# Patient Record
Sex: FEMALE | Race: OTHER | HISPANIC OR LATINO | URBAN - METROPOLITAN AREA
[De-identification: names, ages, dates, MRNs, and addresses within clinical notes are randomized per-mention and may not be internally consistent; named-entity substitution may affect disease eponyms.]

---

## 2018-03-28 ENCOUNTER — EMERGENCY (EMERGENCY)
Facility: HOSPITAL | Age: 28
LOS: 0 days | Discharge: HOME | End: 2018-03-28
Attending: EMERGENCY MEDICINE

## 2018-03-28 VITALS
TEMPERATURE: 98 F | RESPIRATION RATE: 18 BRPM | SYSTOLIC BLOOD PRESSURE: 161 MMHG | HEART RATE: 88 BPM | DIASTOLIC BLOOD PRESSURE: 84 MMHG | OXYGEN SATURATION: 100 %

## 2018-03-28 VITALS
RESPIRATION RATE: 18 BRPM | DIASTOLIC BLOOD PRESSURE: 78 MMHG | TEMPERATURE: 98 F | SYSTOLIC BLOOD PRESSURE: 146 MMHG | OXYGEN SATURATION: 100 % | HEART RATE: 78 BPM

## 2018-03-28 LAB
ALBUMIN SERPL ELPH-MCNC: 4.3 G/DL — SIGNIFICANT CHANGE UP (ref 3.5–5.2)
ALP SERPL-CCNC: 49 U/L — SIGNIFICANT CHANGE UP (ref 30–115)
ALT FLD-CCNC: 10 U/L — SIGNIFICANT CHANGE UP (ref 0–41)
ANION GAP SERPL CALC-SCNC: 11 MMOL/L — SIGNIFICANT CHANGE UP (ref 7–14)
APTT BLD: 29.6 SEC — SIGNIFICANT CHANGE UP (ref 27–39.2)
AST SERPL-CCNC: 15 U/L — SIGNIFICANT CHANGE UP (ref 0–41)
BASOPHILS # BLD AUTO: 0.04 K/UL — SIGNIFICANT CHANGE UP (ref 0–0.2)
BASOPHILS NFR BLD AUTO: 0.5 % — SIGNIFICANT CHANGE UP (ref 0–1)
BILIRUB SERPL-MCNC: 0.5 MG/DL — SIGNIFICANT CHANGE UP (ref 0.2–1.2)
BUN SERPL-MCNC: 16 MG/DL — SIGNIFICANT CHANGE UP (ref 10–20)
CALCIUM SERPL-MCNC: 9.1 MG/DL — SIGNIFICANT CHANGE UP (ref 8.5–10.1)
CHLORIDE SERPL-SCNC: 101 MMOL/L — SIGNIFICANT CHANGE UP (ref 98–110)
CK MB BLD-MCNC: 1 % — SIGNIFICANT CHANGE UP (ref 0–4)
CK MB CFR SERPL CALC: 1.3 NG/ML — SIGNIFICANT CHANGE UP (ref 0.6–6.3)
CK SERPL-CCNC: 156 U/L — SIGNIFICANT CHANGE UP (ref 0–225)
CO2 SERPL-SCNC: 26 MMOL/L — SIGNIFICANT CHANGE UP (ref 17–32)
CREAT SERPL-MCNC: 0.9 MG/DL — SIGNIFICANT CHANGE UP (ref 0.7–1.5)
D DIMER BLD IA.RAPID-MCNC: 125 NG/ML DDU — SIGNIFICANT CHANGE UP (ref 0–230)
EOSINOPHIL # BLD AUTO: 0.25 K/UL — SIGNIFICANT CHANGE UP (ref 0–0.7)
EOSINOPHIL NFR BLD AUTO: 3.2 % — SIGNIFICANT CHANGE UP (ref 0–8)
GLUCOSE SERPL-MCNC: 87 MG/DL — SIGNIFICANT CHANGE UP (ref 70–99)
HCT VFR BLD CALC: 40.3 % — SIGNIFICANT CHANGE UP (ref 37–47)
HGB BLD-MCNC: 13.8 G/DL — SIGNIFICANT CHANGE UP (ref 12–16)
IMM GRANULOCYTES NFR BLD AUTO: 0.5 % — HIGH (ref 0.1–0.3)
INR BLD: 0.93 RATIO — SIGNIFICANT CHANGE UP (ref 0.65–1.3)
LYMPHOCYTES # BLD AUTO: 2.74 K/UL — SIGNIFICANT CHANGE UP (ref 1.2–3.4)
LYMPHOCYTES # BLD AUTO: 35.6 % — SIGNIFICANT CHANGE UP (ref 20.5–51.1)
MCHC RBC-ENTMCNC: 29.6 PG — SIGNIFICANT CHANGE UP (ref 27–31)
MCHC RBC-ENTMCNC: 34.2 G/DL — SIGNIFICANT CHANGE UP (ref 32–37)
MCV RBC AUTO: 86.3 FL — SIGNIFICANT CHANGE UP (ref 81–99)
MONOCYTES # BLD AUTO: 0.63 K/UL — HIGH (ref 0.1–0.6)
MONOCYTES NFR BLD AUTO: 8.2 % — SIGNIFICANT CHANGE UP (ref 1.7–9.3)
NEUTROPHILS # BLD AUTO: 4 K/UL — SIGNIFICANT CHANGE UP (ref 1.4–6.5)
NEUTROPHILS NFR BLD AUTO: 52 % — SIGNIFICANT CHANGE UP (ref 42.2–75.2)
NRBC # BLD: 0 /100 WBCS — SIGNIFICANT CHANGE UP (ref 0–0)
PLATELET # BLD AUTO: 327 K/UL — SIGNIFICANT CHANGE UP (ref 130–400)
POTASSIUM SERPL-MCNC: 4.4 MMOL/L — SIGNIFICANT CHANGE UP (ref 3.5–5)
POTASSIUM SERPL-SCNC: 4.4 MMOL/L — SIGNIFICANT CHANGE UP (ref 3.5–5)
PROT SERPL-MCNC: 7.5 G/DL — SIGNIFICANT CHANGE UP (ref 6–8)
PROTHROM AB SERPL-ACNC: 10 SEC — SIGNIFICANT CHANGE UP (ref 9.95–12.87)
RBC # BLD: 4.67 M/UL — SIGNIFICANT CHANGE UP (ref 4.2–5.4)
RBC # FLD: 12.4 % — SIGNIFICANT CHANGE UP (ref 11.5–14.5)
SODIUM SERPL-SCNC: 138 MMOL/L — SIGNIFICANT CHANGE UP (ref 135–146)
TROPONIN T SERPL-MCNC: <0.01 NG/ML — SIGNIFICANT CHANGE UP
WBC # BLD: 7.7 K/UL — SIGNIFICANT CHANGE UP (ref 4.8–10.8)
WBC # FLD AUTO: 7.7 K/UL — SIGNIFICANT CHANGE UP (ref 4.8–10.8)

## 2018-03-28 RX ORDER — SODIUM CHLORIDE 9 MG/ML
3 INJECTION INTRAMUSCULAR; INTRAVENOUS; SUBCUTANEOUS EVERY 8 HOURS
Qty: 0 | Refills: 0 | Status: DISCONTINUED | OUTPATIENT
Start: 2018-03-28 | End: 2018-03-28

## 2018-03-28 RX ORDER — SODIUM CHLORIDE 9 MG/ML
1000 INJECTION INTRAMUSCULAR; INTRAVENOUS; SUBCUTANEOUS
Qty: 0 | Refills: 0 | Status: DISCONTINUED | OUTPATIENT
Start: 2018-03-28 | End: 2018-03-28

## 2018-03-28 RX ADMIN — SODIUM CHLORIDE 3 MILLILITER(S): 9 INJECTION INTRAMUSCULAR; INTRAVENOUS; SUBCUTANEOUS at 20:29

## 2018-03-28 RX ADMIN — SODIUM CHLORIDE 125 MILLILITER(S): 9 INJECTION INTRAMUSCULAR; INTRAVENOUS; SUBCUTANEOUS at 20:29

## 2018-03-28 NOTE — ED PROVIDER NOTE - OBJECTIVE STATEMENT
26 yo female no sig hx present left chest pain x 1 day. pain is pressure like and worsen with deep inspiration and movement. Denies radiating pain. Reports she takes OCP but no hospitalization/surgery and legs swelling. Denies SOB/diaphoresis/weakness assoc with chest pain. denies fever/chill/abd pain/n/v/d.

## 2018-03-28 NOTE — ED PROVIDER NOTE - MUSCULOSKELETAL, MLM
Spine appears normal, range of motion is not limited, no muscle or joint tenderness. no calf swelling/tenderness

## 2018-03-28 NOTE — ED PROVIDER NOTE - ATTENDING CONTRIBUTION TO CARE
26 yo fm on ocp c/o left upper cp, sharp, pleuritic. no sob, leg pain or swelling. no zambrano, orthopnea. no fever or chills. no n, v, sweats. pt in nad, well appearing, heent nml, neck sup, ctab, rrr, ab soft, nt, nd. tender over left upper chest. non focal. will get labs, ekg, dimer, cxr.

## 2019-04-28 ENCOUNTER — EMERGENCY (EMERGENCY)
Facility: HOSPITAL | Age: 29
LOS: 0 days | Discharge: HOME | End: 2019-04-28
Admitting: EMERGENCY MEDICINE
Payer: COMMERCIAL

## 2019-04-28 VITALS
TEMPERATURE: 98 F | DIASTOLIC BLOOD PRESSURE: 79 MMHG | RESPIRATION RATE: 16 BRPM | HEART RATE: 80 BPM | OXYGEN SATURATION: 97 % | SYSTOLIC BLOOD PRESSURE: 124 MMHG

## 2019-04-28 DIAGNOSIS — M25.561 PAIN IN RIGHT KNEE: ICD-10-CM

## 2019-04-28 DIAGNOSIS — M25.569 PAIN IN UNSPECIFIED KNEE: ICD-10-CM

## 2019-04-28 PROCEDURE — 73562 X-RAY EXAM OF KNEE 3: CPT | Mod: 26,RT

## 2019-04-28 PROCEDURE — 99283 EMERGENCY DEPT VISIT LOW MDM: CPT

## 2019-04-28 RX ORDER — IBUPROFEN 200 MG
600 TABLET ORAL ONCE
Qty: 0 | Refills: 0 | Status: COMPLETED | OUTPATIENT
Start: 2019-04-28 | End: 2019-04-28

## 2019-04-28 RX ADMIN — Medication 600 MILLIGRAM(S): at 15:18

## 2019-04-28 NOTE — ED PROVIDER NOTE - CLINICAL SUMMARY MEDICAL DECISION MAKING FREE TEXT BOX
Patient here for right knee pain occurred while dancing. xray shows no acute fracture. Knee placed in knee immobilizer, ambulating in ED. Recommend nsaids, ice, follow up with ortho.

## 2019-04-28 NOTE — ED PROVIDER NOTE - OBJECTIVE STATEMENT
28 yo female with no significant pmh presents to the ED c/o right knee pain s/p dancing last night. Patient explains as she was dancing she felt her knee buckle. Denies direct trauma to knee or twisting injury. Since then patient states when she ambulates her knee gives out. No additional trauma. Denies chest pain, sob, abd pain, N/V, UE/LE weakness or paresthesias. no prior knee surgery or injury.

## 2019-04-28 NOTE — ED PROVIDER NOTE - NS ED ROS FT
Constitutional: no fever, chills  Cardiovascular: no chest pain, no sob  Respiratory: no cough, no shortness of breath.  Gastrointestinal: no nausea, vomiting   Musculoskeletal: + right knee pain. no hip, ankle or back pain  Integumentary: no rash or skin changes. no edema  Neurological: no head trauma.  no headache, no dizziness, no visual changes, no UE/LE weakness or paresthesias

## 2019-04-28 NOTE — ED PROVIDER NOTE - NSFOLLOWUPINSTRUCTIONS_ED_ALL_ED_FT

## 2019-04-28 NOTE — ED ADULT NURSE NOTE - OBJECTIVE STATEMENT
pt states she was out drinking last night and hurt her knee, her friends said she twisted her body around. Pt woke up with knee pain, unable to bend without pain.

## 2019-04-28 NOTE — ED PROVIDER NOTE - PHYSICAL EXAMINATION
GENERAL:  well appearing, non-toxic female in no acute distress  SKIN: skin warm, pink and dry. MMM. + mild swelling to right knee.  no erythema, increased warmth or ecchymosis. cap refill <2 secs.   PULM: CTAB. Normal respiratory effort. No respiratory distress. No wheezes, stridor, rales or rhonchi. No retractions  CV: RRR, no M/R/G.   ABD: Soft, non-tender, non-distended  MSK: + TTP right medial knee joint with ROM of knee. no hip or ankle tenderness.  No edema, erythema, cyanosis. dp pulses equal and intact bilaterally.   NEURO: A+Ox3, no sensory/motor deficits  PSYCH: appropriate behavior, cooperative.

## 2019-04-28 NOTE — ED PROVIDER NOTE - CARE PROVIDER_API CALL
Ochoa Chandler)  Orthopaedic Surgery  [address] [place] [address]  Phone: [phone]  Fax: [phone]  Follow Up Time:

## 2019-09-13 ENCOUNTER — TRANSCRIPTION ENCOUNTER (OUTPATIENT)
Age: 29
End: 2019-09-13

## 2019-11-01 ENCOUNTER — OUTPATIENT (OUTPATIENT)
Dept: OUTPATIENT SERVICES | Facility: HOSPITAL | Age: 29
LOS: 1 days | Discharge: HOME | End: 2019-11-01
Payer: COMMERCIAL

## 2019-11-01 ENCOUNTER — RESULT REVIEW (OUTPATIENT)
Age: 29
End: 2019-11-01

## 2019-11-01 VITALS
HEART RATE: 75 BPM | SYSTOLIC BLOOD PRESSURE: 125 MMHG | OXYGEN SATURATION: 100 % | DIASTOLIC BLOOD PRESSURE: 84 MMHG | RESPIRATION RATE: 18 BRPM | TEMPERATURE: 98 F

## 2019-11-01 VITALS
WEIGHT: 145.95 LBS | HEIGHT: 59 IN | HEART RATE: 76 BPM | RESPIRATION RATE: 18 BRPM | OXYGEN SATURATION: 97 % | DIASTOLIC BLOOD PRESSURE: 73 MMHG | TEMPERATURE: 98 F | SYSTOLIC BLOOD PRESSURE: 122 MMHG

## 2019-11-01 DIAGNOSIS — M24.661 ANKYLOSIS, RIGHT KNEE: ICD-10-CM

## 2019-11-01 DIAGNOSIS — M24.662 ANKYLOSIS, LEFT KNEE: ICD-10-CM

## 2019-11-01 DIAGNOSIS — Z88.2 ALLERGY STATUS TO SULFONAMIDES: ICD-10-CM

## 2019-11-01 PROCEDURE — 88304 TISSUE EXAM BY PATHOLOGIST: CPT | Mod: 26

## 2019-11-01 RX ORDER — HYDROMORPHONE HYDROCHLORIDE 2 MG/ML
0.5 INJECTION INTRAMUSCULAR; INTRAVENOUS; SUBCUTANEOUS
Refills: 0 | Status: DISCONTINUED | OUTPATIENT
Start: 2019-11-01 | End: 2019-11-01

## 2019-11-01 RX ORDER — SODIUM CHLORIDE 9 MG/ML
1000 INJECTION, SOLUTION INTRAVENOUS
Refills: 0 | Status: DISCONTINUED | OUTPATIENT
Start: 2019-11-01 | End: 2019-11-16

## 2019-11-01 RX ORDER — OXYCODONE AND ACETAMINOPHEN 5; 325 MG/1; MG/1
1 TABLET ORAL ONCE
Refills: 0 | Status: DISCONTINUED | OUTPATIENT
Start: 2019-11-01 | End: 2019-11-01

## 2019-11-01 RX ORDER — ONDANSETRON 8 MG/1
4 TABLET, FILM COATED ORAL ONCE
Refills: 0 | Status: DISCONTINUED | OUTPATIENT
Start: 2019-11-01 | End: 2019-11-16

## 2019-11-01 RX ADMIN — SODIUM CHLORIDE 100 MILLILITER(S): 9 INJECTION, SOLUTION INTRAVENOUS at 15:40

## 2019-11-01 NOTE — CHART NOTE - NSCHARTNOTEFT_GEN_A_CORE
PACU ANESTHESIA ADMISSION NOTE      Procedure: Manipulation, knee  Arthroscopic lysis, adhesions, knee    Post op diagnosis:  Arthrofibrosis of knee joint, right      ____  Intubated  TV:______       Rate: ______      FiO2: ______    _x___  Patent Airway    _x___  Full return of protective reflexes    _x___  Full recovery from anesthesia / back to baseline status    Vitals  SPO2:-99% on 2l nc  HR:-98  RR:-12  B.P:-132/87  TEMP:-97.6    Mental Status:  _x___ Awake   ___x_ Alert   _____ Drowsy   _____ Sedated    Nausea/Vomiting:  _x___  NO       ______Yes,   See Post - Op Orders         Pain Scale (0-10):  __0___    Treatment: _x___ None    __x__ See Post - Op/PCA Orders    Post - Operative Fluids:   ___ Oral   ____x See Post - Op Orders    Plan: Discharge:   _x___Home       ____Floor     _____Critical Care    _____  Other:_________________    Comments:  Report endorsed to RN in pacu  Vitals stable  No anesthesia issues or complications noted.  Discharge to patient to  home when criteria met.

## 2019-11-01 NOTE — ASU DISCHARGE PLAN (ADULT/PEDIATRIC) - ASU DC SPECIAL INSTRUCTIONSFT
Post Operative Instructions for Knee Surgery    Your Surgery Included  [ ] Menisectomy  [ ] Meniscus Repair					  [ ] Synovectomy/Plica Excision	  [ ] Debridement/Chondroplasty  [ x] Lysis of Adhesions  [ ] ACL reconstruction			  [ ] PCL Reconstruction  [ ] Other:  	  Call our office ([phone]) immediately if you experience any of the following:  •	Excessive bleeding or pus like drainage at the incision site  •	Uncontrollable pain not relieved by pain medication  •	Excessive swelling or redness at the incision site  •	Fever above 101.5 degrees not controlled with Tylenol or Motrin  •	Shortness of Breath  •	Any foul odor or blistering from the surgery site    Pain Management: You were given one or more prescriptions before leaving the hospital. Have the prescriptions filled at a pharmacy on your way home and follow the instructions on the bottles.   Regional Anesthesia Injections (Blocks): You may have been given a regional nerve block either before or after surgery. This may numb your leg for 24-36 hours  	*Proceed with caution when weight bearing on your leg    Diet: Eat a bland diet for the first day after surgery. Progress your diet as tolerated. Constipation may occur with Narcotic usage, contact our office if you are experiencing constipation.    Activity: Limit your activity during the first 48 hours, keep your leg elevated with pillows under your heel. After the first 48 hours at home, increase your activity level based on your symptoms.    Dressing Change: Remove the dressing on the 3rd day. It is normal for some blood to be seen on the dressings. It is also normal for you to see apparent bruising on the skin around your knee when you remove the dressing. If present, leave the steri-strip tape across the incisions. If you are concerned by the drainage or the appearance of your knee, please call one of the numbers listed. Keep covered with Band-Aids/bandages.    Showering: You may shower on the 5th day after surgery if the wound is dry and clean, but do not let the wound soak in water until sutures are removed. Do not submerge in any water until after your postoperative appointment in clinic.    Weight Bearing:						  [x ] Weight bearing as tolerated		  [ ] Nonweight bearing				  [ ] Other:						    Operative Knee Range of Motion  [x ] Full range of motion  [ ] ROM 0-90 deg  [ ] Other:    Knee Exercises: You may do these exercises for 2-5 mins five times a day in order to help regain your range of motion.  [ x] Quad Sets: Begin activating your quadriceps muscle by driving your knee downward into full knee extension while seated on a table or bed   with a towel rolled and propped under your heel  [x ] Straight Leg Raise: While dee your quadriceps muscle, lift your fully extended leg to the level of your non-operative knee  [x ] Heel Slides: With the knee straight, slide your heel slowly toward your buttocks, hold at the endpoint for 10-15 seconds, then slowly straighten  [x ] Ankle Pumps: With your knee straight, move your ankle in a "pumping"  fashion to activate your calf and leg muscle     Follow Up: As Scheduled

## 2019-11-01 NOTE — BRIEF OPERATIVE NOTE - NSICDXBRIEFPROCEDURE_GEN_ALL_CORE_FT
PROCEDURES:  Manipulation, knee 01-Nov-2019 15:08:39  James Tapia  Arthroscopic lysis, adhesions, knee 01-Nov-2019 15:08:32  James Tapia

## 2019-11-01 NOTE — PRE-ANESTHESIA EVALUATION ADULT - NSANTHOSAYNRD_GEN_A_CORE
No. ASPEN screening performed.  STOP BANG Legend: 0-2 = LOW Risk; 3-4 = INTERMEDIATE Risk; 5-8 = HIGH Risk

## 2019-11-08 LAB — SURGICAL PATHOLOGY STUDY: SIGNIFICANT CHANGE UP

## 2020-04-26 ENCOUNTER — MESSAGE (OUTPATIENT)
Age: 30
End: 2020-04-26

## 2020-05-06 ENCOUNTER — APPOINTMENT (OUTPATIENT)
Dept: DISASTER EMERGENCY | Facility: HOSPITAL | Age: 30
End: 2020-05-06

## 2020-05-07 LAB
SARS-COV-2 IGG SERPL IA-ACNC: 0.1 INDEX
SARS-COV-2 IGG SERPL QL IA: NEGATIVE

## 2020-05-20 NOTE — ASU PATIENT PROFILE, ADULT - NSALCOHOLTYPE_GEN__A_CORE_SD
[No Ischemia] : no Ischemia [No Exercise Ind Arr] : no exercise induced arrhythmias [No Symptoms] : no Symptoms [___] : [unfilled] [LVEF ___%] : LVEF [unfilled]% [None] : normal LV function [Severe] : severe pulmonary hypertension  [Normal] : normal LA size [Mild] : mild mitral regurgitation [___] : [unfilled] wine

## 2020-05-30 ENCOUNTER — EMERGENCY (EMERGENCY)
Facility: HOSPITAL | Age: 30
LOS: 0 days | Discharge: HOME | End: 2020-05-30

## 2020-05-31 ENCOUNTER — OUTPATIENT (OUTPATIENT)
Dept: OUTPATIENT SERVICES | Facility: HOSPITAL | Age: 30
LOS: 1 days | Discharge: HOME | End: 2020-05-31

## 2020-05-31 VITALS
SYSTOLIC BLOOD PRESSURE: 128 MMHG | HEART RATE: 90 BPM | DIASTOLIC BLOOD PRESSURE: 74 MMHG | RESPIRATION RATE: 20 BRPM | TEMPERATURE: 98 F

## 2020-05-31 VITALS — DIASTOLIC BLOOD PRESSURE: 74 MMHG | HEART RATE: 90 BPM | SYSTOLIC BLOOD PRESSURE: 128 MMHG

## 2020-05-31 DIAGNOSIS — O26.892 OTHER SPECIFIED PREGNANCY RELATED CONDITIONS, SECOND TRIMESTER: ICD-10-CM

## 2020-05-31 DIAGNOSIS — Z02.9 ENCOUNTER FOR ADMINISTRATIVE EXAMINATIONS, UNSPECIFIED: ICD-10-CM

## 2020-05-31 DIAGNOSIS — Z98.890 OTHER SPECIFIED POSTPROCEDURAL STATES: Chronic | ICD-10-CM

## 2020-05-31 DIAGNOSIS — R10.9 UNSPECIFIED ABDOMINAL PAIN: ICD-10-CM

## 2020-05-31 LAB
APPEARANCE UR: CLEAR — SIGNIFICANT CHANGE UP
BILIRUB UR-MCNC: NEGATIVE — SIGNIFICANT CHANGE UP
COLOR SPEC: COLORLESS — SIGNIFICANT CHANGE UP
DIFF PNL FLD: NEGATIVE — SIGNIFICANT CHANGE UP
GLUCOSE UR QL: NEGATIVE — SIGNIFICANT CHANGE UP
KETONES UR-MCNC: NEGATIVE — SIGNIFICANT CHANGE UP
LEUKOCYTE ESTERASE UR-ACNC: NEGATIVE — SIGNIFICANT CHANGE UP
NITRITE UR-MCNC: NEGATIVE — SIGNIFICANT CHANGE UP
PH UR: 6 — SIGNIFICANT CHANGE UP (ref 5–8)
PROT UR-MCNC: NEGATIVE — SIGNIFICANT CHANGE UP
SP GR SPEC: 1.01 — LOW (ref 1.01–1.02)
UROBILINOGEN FLD QL: SIGNIFICANT CHANGE UP

## 2020-05-31 NOTE — OB PROVIDER TRIAGE NOTE - NSOBPROVIDERNOTE_OBGYN_ALL_OB_FT
31yo  @17w1d, GBS unknown, not in labor, reassuring maternal and fetal status    -recommend tylenol PO and hydration for cramping  -will follow-up UA and UCx  -f/u with OB during next appointment in 3 weeks    Dr. Bennett and Dr. Rodas aware

## 2020-05-31 NOTE — OB PROVIDER TRIAGE NOTE - NSHPPHYSICALEXAM_GEN_ALL_CORE
Vital Signs Last 24 Hrs  T(C): 36.8 (31 May 2020 00:30), Max: 36.8 (31 May 2020 00:30)  T(F): 98.2 (31 May 2020 00:30), Max: 98.2 (31 May 2020 00:30)  HR: 90 (31 May 2020 00:30) (90 - 90)  BP: 128/74 (31 May 2020 00:30) (128/74 - 128/74)  RR: 20 (31 May 2020 00:30) (20 - 20)    Gen: AAOx3, NAD  Heart: S1S2, RRR  Lungs: CTAB  Abd: soft, nondistended, no rebound/guarding, no CVA tenderness, mild suprapubic tenderness, BS+  Speculum: no blood, no pooling, physiological discharge, cervix closed, 0.5cm nabothian cyst @12o'clock   BSS: Cvx 3.08cm, post placenta, breech, FHR 153bpm, MVP 5.1cm  SVE: 0/0/-3, neg CMT, no adnexal tenderness

## 2020-05-31 NOTE — OB PROVIDER TRIAGE NOTE - HISTORY OF PRESENT ILLNESS
31yo  @17w1d, DEANNA 2020 by LMP c/w first trimester sonogram per patient presents to L&D for evaluation of abdominal pain. She reports a sharp periumbilical pain of sudden onset that radiated down to the vagina, @1730 and lasted a few seconds and resolved on its own. She has since been feeling pelvic pressure and mild cramping, 3/10 intensity. She reports walking improves the cramping, cannot identify aggravating factors. Patient denies taking OTC meds for pain relief. This pregnancy has been uncomplicated so far. She reports occasional urinary urgency and frequency since becoming pregnant, denies burning or hematuria. Also denies fever, chills, nausea, vomiting, constipation/diarrhea, vaginal bleeding, LOF, abnormal discharge, or pain with intercourse. Last intercourse 2 days ago, last bm yesterday, and last PO intake @2100 last night. No other complaints.

## 2020-06-01 LAB
CULTURE RESULTS: NO GROWTH — SIGNIFICANT CHANGE UP
SPECIMEN SOURCE: SIGNIFICANT CHANGE UP

## 2020-07-07 NOTE — OB PROVIDER TRIAGE NOTE - NS_CTXBYPALP_OBGYN_ALL_OB
[Breast Self Exam] : breast self exam [Vitamins/Supplements] : vitamins/supplements [Exercise] : exercise [Nutrition] : nutrition [Menstrual Calendar] : menstrual calendar [STD (testing, results, tx)] : STD (testing, results, tx) [Contraception] : contraception None felt

## 2020-07-18 ENCOUNTER — OUTPATIENT (OUTPATIENT)
Dept: OUTPATIENT SERVICES | Facility: HOSPITAL | Age: 30
LOS: 1 days | Discharge: HOME | End: 2020-07-18

## 2020-07-18 VITALS — DIASTOLIC BLOOD PRESSURE: 79 MMHG | SYSTOLIC BLOOD PRESSURE: 120 MMHG | HEART RATE: 77 BPM

## 2020-07-18 VITALS
TEMPERATURE: 99 F | DIASTOLIC BLOOD PRESSURE: 83 MMHG | HEART RATE: 85 BPM | SYSTOLIC BLOOD PRESSURE: 130 MMHG | RESPIRATION RATE: 18 BRPM

## 2020-07-18 DIAGNOSIS — Z98.890 OTHER SPECIFIED POSTPROCEDURAL STATES: Chronic | ICD-10-CM

## 2020-07-18 LAB — BLD GP AB SCN SERPL QL: SIGNIFICANT CHANGE UP

## 2020-07-18 NOTE — OB PROVIDER TRIAGE NOTE - NSOBPROVIDERNOTE_OBGYN_ALL_OB_FT
A/P: 31yo  at 24w0d GA, Rh unknown, h/o low-lying placenta (per pt), with one single episode of vaginal spotting while wiping; with no current vaginal or cervical bleeding; with reassuring maternal and fetal wellbeing with BPP-/8  -discussed  labor precautions  -discussed fetal kick count instructions   -discussed PO maternal hydration   -follow-up with PMD at next scheduled prenatal appointment     Dr. Saeed and Dr. Rodas aware A/P: 31yo  at 24w0d GA, episode of spotting when wiping, now resolved, no concerns for  labor, with reassuring maternal and fetal wellbeing and BPP 10/10.   -discussed  labor precautions  -discussed fetal kick count instructions   -discussed PO maternal hydration   -Colace for stool softeners, increase PO water intake.   -follow-up with PMD at next scheduled prenatal appointment     Dr. Saeed and Dr. Rodas aware

## 2020-07-18 NOTE — OB PROVIDER TRIAGE NOTE - NSHPLABSRESULTS_GEN_ALL_CORE
Prenatal Labs:   no prenatal labs available    Sonos:  no prenatal labs available Prenatal Labs:   RH Positive     Sonos:  no prenatal labs available

## 2020-07-18 NOTE — OB PROVIDER TRIAGE NOTE - NSHPPHYSICALEXAM_GEN_ALL_CORE
Vital Signs Last 24 Hrs  T(F): 98.8 (18 Jul 2020 19:36), Max: 98.8 (18 Jul 2020 19:36)  HR: 85 (18 Jul 2020 19:36) (85 - 85)  BP: 130/83 (18 Jul 2020 19:36) (130/83 - 130/83)  RR: 18 (18 Jul 2020 19:36) (18 - 18)    Physical Exam:  GA: AOX3, no apparent distress  Resp: CTAB  Cardio: RRR  Abd: soft, nontender, no palpable ctx, gravid  Extr: no erythema or pain to palpation bilaterally   SVE: C/L/P    EFM: 140bpm/moderate variability/+accels   [place]: none   BSS: cephalic, cvx-3.68cm, BPP-8/8, posterior placenta, MVP-5.32cm

## 2020-07-18 NOTE — OB PROVIDER TRIAGE NOTE - HISTORY OF PRESENT ILLNESS
Pt presents to triage for evaluation of abdominal cramping started after the organism (during dreaming) associated with vaginal spotting that occurred after wiping. One episode of spotting today  rates abdominal pain 4/10 in intensity did not take anything at home for the pain   increased discharge, clear, no foul smelling   H/o low lying placenta     Greater than 20 weeks:  Fetal movement: Positive   Leakage of fluid: Denies   Vaginal bleeding: Reports  Contractions: Denies     ROS:  Headache:  Vision changes:  Chest pain:  Shortness of breath:  Cough:  Nausea/Vomiting:  Diarrhea:  Constipation:   Abdominal pain:  Dysuria/urinary hesitency/urinary urgency:  Swelling or erythema of lower extremities:     Last bowel movement: two hours ago, normal   Last occurrence of sexual intercourse: 2 weeks ago   Sick contacts: Denies   Recent Travel: [place] (end of June-beginning of July, drove)    Last prenatal appointment: Tuesday   Next prenatal appointment: 8/3/2020 & 8/11/2020 29yo  at 24w0d GA dated by LMP c/w first tri sonanand presents to triage for evaluation of abdominal cramping started after the organism (during dreaming) associated with one episode vaginal spotting that occurred after wiping last night. Pt reports no additional episodes of spotting since last night. Pt rates her abdominal pain 4/10 in intensity, did not take anything at home for the pain. Pt reports increased discharge, clear, no foul smelling. H/o low lying placenta   Reports good fetal movement. Denies ctx or LOF.   Denies HA, vision changes, chest pain, SOB, cough, chest pain, N/V/D, constipation, dysuria, urinary frequency, or urinary urgency, swelling of the lower extremities or erythema.     Last bowel movement: two hours ago, normal   Last occurrence of sexual intercourse: 2 weeks ago   Sick contacts: Denies   Recent Travel: [place] (end of -beginning of July, drove)    Last prenatal appointment: Tuesday   Next prenatal appointment: 8/3/2020 & 2020 31yo  at 24w0d GA dated by LMP c/w first tri sonanand presents to triage for evaluation of abdominal cramping. She reported episode of vaginal spotting, after wiping on toilet, with cramping after she awoke after having an organism (during dreaming). Pt reports no additional episodes of spotting since last night. Reports history of hemorrhoids and constipation. Pt rates her abdominal pain 4/10 in intensity, now resolved, not requiring mediation. Pt reports increased discharge, clear, no foul smelling. H/o low lying placenta, resolved. Reports good fetal movement. Denies ctx or LOF.   Denies HA, vision changes, chest pain, SOB, cough, chest pain, N/V/D, constipation, dysuria, urinary frequency, or urinary urgency, swelling of the lower extremities or erythema.     Last bowel movement: two hours ago, normal   Last occurrence of sexual intercourse: 2 weeks ago   Sick contacts: Denies   Recent Travel: [place] (end of -beginning of July, drove)    Last prenatal appointment: Tuesday   Next prenatal appointment: 8/3/2020

## 2021-08-25 NOTE — ASU DISCHARGE PLAN (ADULT/PEDIATRIC) - MODE OF TRANSPORTATION
Patient calling, states she has an injury to her finger and would like a call from nursing to ensure that she is properly taking care of it so she can avoid infection.     Call CELL [phone]  
She is doing the right wound care, continue to wash with soap and water at least once daily, neosporin, bandage.   
Spoke with patient and conveyed message from Dr. Choudhary. Patient verbalized understanding and had no other questions.   
Spoke with patient. She states that she wears acrylic nails. She was cleaning her gas top stove. She reports that she accidentally jammed her finger on the burner. The burner was off but her fingernail tore off. She did not notice any bleeding. She states she washed the area with soap and water and applied neosporin and wrapped in gauze.    Patient states that her finger is tender but no bleeding. She would like to know if Dr. Choudhary has any recommendations. She states she plans on washing with soap and water once daily and applying neosporin and a bandaid but is wanting to know if she should do anything else or needs to be seen. Please advise.  
Wheelchair/Stroller

## 2022-02-08 NOTE — H&P PST ADULT - CARDIOVASCULAR
negative Regular rate & rhythm, normal S1, S2; no murmurs, gallops or rubs; no S3, S4 Pfizer dose 1, 2, and 3

## 2022-08-08 PROBLEM — Z00.00 ENCOUNTER FOR PREVENTIVE HEALTH EXAMINATION: Status: ACTIVE | Noted: 2022-08-08

## 2022-11-22 ENCOUNTER — NON-APPOINTMENT (OUTPATIENT)
Age: 32
End: 2022-11-22

## 2022-11-23 ENCOUNTER — RESULT REVIEW (OUTPATIENT)
Age: 32
End: 2022-11-23

## 2024-10-03 NOTE — ED ADULT NURSE NOTE - CAS ELECT INFOMATION PROVIDED
[de-identified] : 40 year old F with a long-standing h/o obesity, who presents today for follow up after starting weight loss medication Zepbound. Weight loss since last visit. Consuming adequate protein intake with 3 meals/day. Daily water intake adequate ~40-64oz/day. Walking for activities, plus kickboxing though limited due to work schedule. Sleeping well. No abdominal pain, reflux, nausea, vomiting, constipation or diarrhea.  Pt adds wishes to start planning for pregnancy March 2025.   Starting weight at initial consult: 246 lbs Current weight at today's visit: 232 lbs  Anti-obesity medication(s): Zepbound (switched from Wegovy 2.4 mg due to poor appetite suppression) Start date: July 2024 (started Wegovy June 2023) Current dose: 10mg (injection #1 of box#2 out of 3 9/28/2024) Side-effects from anti-obesity medication(s): pt reports none Obesity comorbidities: HTN, PCOS Comorbidities improved/resolved: N/A History of bariatric surgery: No DC instructions

## 2024-12-16 NOTE — PRE-ANESTHESIA EVALUATION ADULT - MALLAMPATI CLASS
Routed providers office to address during normal business hours.    Class II - visualization of the soft palate, fauces, and uvula

## 2025-06-16 NOTE — PRE-ANESTHESIA EVALUATION ADULT - HEIGHT IN INCHES
11
Horacio Dunham  [facility]  PHYSMED [address]  Scheduled Appointment: 06/25/2025    Horacio Dunham  [facility] [address]  Scheduled Appointment: 07/02/2025